# Patient Record
Sex: FEMALE | Race: OTHER | HISPANIC OR LATINO | Employment: FULL TIME | URBAN - METROPOLITAN AREA
[De-identification: names, ages, dates, MRNs, and addresses within clinical notes are randomized per-mention and may not be internally consistent; named-entity substitution may affect disease eponyms.]

---

## 2019-03-02 ENCOUNTER — APPOINTMENT (EMERGENCY)
Dept: RADIOLOGY | Facility: HOSPITAL | Age: 44
End: 2019-03-02

## 2019-03-02 ENCOUNTER — HOSPITAL ENCOUNTER (EMERGENCY)
Facility: HOSPITAL | Age: 44
Discharge: HOME | End: 2019-03-02
Attending: EMERGENCY MEDICINE | Admitting: EMERGENCY MEDICINE

## 2019-03-02 VITALS
OXYGEN SATURATION: 99 % | TEMPERATURE: 98.2 F | DIASTOLIC BLOOD PRESSURE: 60 MMHG | HEART RATE: 98 BPM | RESPIRATION RATE: 16 BRPM | SYSTOLIC BLOOD PRESSURE: 136 MMHG

## 2019-03-02 DIAGNOSIS — B02.9 HERPES ZOSTER: Primary | ICD-10-CM

## 2019-03-02 DIAGNOSIS — D72.829 LEUKOCYTOSIS: ICD-10-CM

## 2019-03-02 DIAGNOSIS — N64.4 BREAST PAIN, LEFT: ICD-10-CM

## 2019-03-02 LAB
ALBUMIN SERPL BCP-MCNC: 3.7 G/DL (ref 3.5–5)
ALP SERPL-CCNC: 64 U/L (ref 46–116)
ALT SERPL W P-5'-P-CCNC: 26 U/L (ref 12–78)
ANION GAP SERPL CALCULATED.3IONS-SCNC: 8 MMOL/L (ref 4–13)
APTT PPP: 26 SECONDS (ref 26–38)
AST SERPL W P-5'-P-CCNC: 17 U/L (ref 5–45)
BASOPHILS # BLD AUTO: 0.06 THOUSANDS/ΜL (ref 0–0.1)
BASOPHILS NFR BLD AUTO: 1 % (ref 0–1)
BILIRUB SERPL-MCNC: 0.27 MG/DL (ref 0.2–1)
BUN SERPL-MCNC: 15 MG/DL (ref 5–25)
CALCIUM SERPL-MCNC: 9 MG/DL (ref 8.3–10.1)
CHLORIDE SERPL-SCNC: 104 MMOL/L (ref 100–108)
CK SERPL-CCNC: 97 U/L (ref 26–192)
CO2 SERPL-SCNC: 27 MMOL/L (ref 21–32)
CREAT SERPL-MCNC: 1.03 MG/DL (ref 0.6–1.3)
EOSINOPHIL # BLD AUTO: 0.22 THOUSAND/ΜL (ref 0–0.61)
EOSINOPHIL NFR BLD AUTO: 2 % (ref 0–6)
ERYTHROCYTE [DISTWIDTH] IN BLOOD BY AUTOMATED COUNT: 13.2 % (ref 11.6–15.1)
EXT PREG TEST URINE: NEGATIVE
GFR SERPL CREATININE-BSD FRML MDRD: 67 ML/MIN/1.73SQ M
GLUCOSE SERPL-MCNC: 67 MG/DL (ref 65–140)
HCT VFR BLD AUTO: 42.4 % (ref 34.8–46.1)
HGB BLD-MCNC: 13.7 G/DL (ref 11.5–15.4)
IMM GRANULOCYTES # BLD AUTO: 0.03 THOUSAND/UL (ref 0–0.2)
IMM GRANULOCYTES NFR BLD AUTO: 0 % (ref 0–2)
INR PPP: 0.95 (ref 0.86–1.17)
LIPASE SERPL-CCNC: 147 U/L (ref 73–393)
LYMPHOCYTES # BLD AUTO: 4.26 THOUSANDS/ΜL (ref 0.6–4.47)
LYMPHOCYTES NFR BLD AUTO: 36 % (ref 14–44)
MAGNESIUM SERPL-MCNC: 1.9 MG/DL (ref 1.6–2.6)
MCH RBC QN AUTO: 29.7 PG (ref 26.8–34.3)
MCHC RBC AUTO-ENTMCNC: 32.3 G/DL (ref 31.4–37.4)
MCV RBC AUTO: 92 FL (ref 82–98)
MONOCYTES # BLD AUTO: 1 THOUSAND/ΜL (ref 0.17–1.22)
MONOCYTES NFR BLD AUTO: 8 % (ref 4–12)
NEUTROPHILS # BLD AUTO: 6.43 THOUSANDS/ΜL (ref 1.85–7.62)
NEUTS SEG NFR BLD AUTO: 53 % (ref 43–75)
NRBC BLD AUTO-RTO: 0 /100 WBCS
PLATELET # BLD AUTO: 225 THOUSANDS/UL (ref 149–390)
PMV BLD AUTO: 10.5 FL (ref 8.9–12.7)
POTASSIUM SERPL-SCNC: 3.8 MMOL/L (ref 3.5–5.3)
PROT SERPL-MCNC: 7.6 G/DL (ref 6.4–8.2)
PROTHROMBIN TIME: 12.8 SECONDS (ref 11.8–14.2)
RBC # BLD AUTO: 4.62 MILLION/UL (ref 3.81–5.12)
SODIUM SERPL-SCNC: 139 MMOL/L (ref 136–145)
TROPONIN I SERPL-MCNC: <0.02 NG/ML
WBC # BLD AUTO: 12 THOUSAND/UL (ref 4.31–10.16)

## 2019-03-02 PROCEDURE — 82550 ASSAY OF CK (CPK): CPT | Performed by: NURSE PRACTITIONER

## 2019-03-02 PROCEDURE — 83735 ASSAY OF MAGNESIUM: CPT | Performed by: NURSE PRACTITIONER

## 2019-03-02 PROCEDURE — 80053 COMPREHEN METABOLIC PANEL: CPT | Performed by: NURSE PRACTITIONER

## 2019-03-02 PROCEDURE — 36415 COLL VENOUS BLD VENIPUNCTURE: CPT | Performed by: NURSE PRACTITIONER

## 2019-03-02 PROCEDURE — 83690 ASSAY OF LIPASE: CPT | Performed by: NURSE PRACTITIONER

## 2019-03-02 PROCEDURE — 93005 ELECTROCARDIOGRAM TRACING: CPT

## 2019-03-02 PROCEDURE — 71046 X-RAY EXAM CHEST 2 VIEWS: CPT

## 2019-03-02 PROCEDURE — 85610 PROTHROMBIN TIME: CPT | Performed by: NURSE PRACTITIONER

## 2019-03-02 PROCEDURE — 99284 EMERGENCY DEPT VISIT MOD MDM: CPT

## 2019-03-02 PROCEDURE — 81025 URINE PREGNANCY TEST: CPT | Performed by: NURSE PRACTITIONER

## 2019-03-02 PROCEDURE — 85730 THROMBOPLASTIN TIME PARTIAL: CPT | Performed by: NURSE PRACTITIONER

## 2019-03-02 PROCEDURE — 85025 COMPLETE CBC W/AUTO DIFF WBC: CPT | Performed by: NURSE PRACTITIONER

## 2019-03-02 PROCEDURE — 84484 ASSAY OF TROPONIN QUANT: CPT | Performed by: NURSE PRACTITIONER

## 2019-03-02 RX ORDER — ACYCLOVIR 800 MG/1
800 TABLET ORAL
Qty: 35 TABLET | Refills: 0 | Status: SHIPPED | OUTPATIENT
Start: 2019-03-02 | End: 2019-03-09

## 2019-03-02 RX ORDER — ACYCLOVIR 800 MG/1
800 TABLET ORAL
Status: DISCONTINUED | OUTPATIENT
Start: 2019-03-02 | End: 2019-03-03 | Stop reason: HOSPADM

## 2019-03-02 RX ORDER — OXYCODONE HYDROCHLORIDE AND ACETAMINOPHEN 5; 325 MG/1; MG/1
1 TABLET ORAL ONCE
Status: COMPLETED | OUTPATIENT
Start: 2019-03-02 | End: 2019-03-02

## 2019-03-02 RX ORDER — OXYCODONE HYDROCHLORIDE AND ACETAMINOPHEN 5; 325 MG/1; MG/1
1 TABLET ORAL EVERY 6 HOURS PRN
Qty: 10 TABLET | Refills: 0 | Status: SHIPPED | OUTPATIENT
Start: 2019-03-02 | End: 2019-03-05

## 2019-03-02 RX ORDER — DOCUSATE SODIUM 100 MG/1
100 CAPSULE, LIQUID FILLED ORAL EVERY 12 HOURS
Qty: 60 CAPSULE | Refills: 0 | Status: SHIPPED | OUTPATIENT
Start: 2019-03-02

## 2019-03-02 RX ORDER — DOCUSATE SODIUM 100 MG/1
100 CAPSULE, LIQUID FILLED ORAL ONCE
Status: COMPLETED | OUTPATIENT
Start: 2019-03-02 | End: 2019-03-02

## 2019-03-02 RX ADMIN — ACYCLOVIR 800 MG: 800 TABLET ORAL at 23:19

## 2019-03-02 RX ADMIN — DOCUSATE SODIUM 100 MG: 100 CAPSULE, LIQUID FILLED ORAL at 23:19

## 2019-03-02 RX ADMIN — OXYCODONE HYDROCHLORIDE AND ACETAMINOPHEN 1 TABLET: 5; 325 TABLET ORAL at 23:19

## 2019-03-03 NOTE — ED PROVIDER NOTES
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  She does state she had chicken pox as a child  She also mentions that her last mammogram was 3 years ago and that her mother  of breast cancer  She denies any actual chest pain or SOB or pain with inspiration  She states she did recently fly 2 months ago  Denies hx of DVT, PE, smoking, OCP  Pt denies any lesions on skin or nipple drainage, palpable masses or dimpling of skin  Denies breast implants       History provided by:  Patient and medical records   used: No        None       Past Medical History:   Diagnosis Date    History of nephrectomy        Past Surgical History:   Procedure Laterality Date    APPENDECTOMY      TUBAL LIGATION         History reviewed  No pertinent family history  I have reviewed and agree with the history as documented  Social History     Tobacco Use    Smoking status: Never Smoker   Substance Use Topics    Alcohol use: Never     Frequency: Never    Drug use: Never        Review of Systems   Constitutional: Negative  HENT: Negative  Eyes: Negative  Respiratory: Negative  Cardiovascular: Negative  Gastrointestinal: Negative  Endocrine: Negative  Genitourinary: Negative  Musculoskeletal: Positive for back pain  Skin: Negative for rash  Allergic/Immunologic: Negative  Neurological: Negative  Hematological: Negative  Psychiatric/Behavioral: Negative  Physical Exam  Physical Exam   Constitutional: She is oriented to person, place, and time   She appears well-developed and well-nourished  She appears distressed  Appears uncomfortable  HENT:   Head: Normocephalic and atraumatic  Eyes: Pupils are equal, round, and reactive to light  EOM are normal    Neck: Normal range of motion  Neck supple  Cardiovascular: Normal rate, regular rhythm and normal heart sounds  Pulmonary/Chest: Effort normal and breath sounds normal  No stridor  No respiratory distress  She has no wheezes  She has no rales  She exhibits no tenderness  Abdominal: Soft  Bowel sounds are normal  She exhibits no distension  There is no tenderness  Musculoskeletal: Normal range of motion  Neurological: She is alert and oriented to person, place, and time  Skin: Skin is warm and dry  Capillary refill takes less than 2 seconds  No rash noted  She is not diaphoretic  No erythema  No palpable lymphadenopathy noted    Psychiatric: She has a normal mood and affect  Her behavior is normal  Judgment and thought content normal    Nursing note and vitals reviewed        Vital Signs  ED Triage Vitals   Temperature Pulse Respirations Blood Pressure SpO2   03/02/19 2050 03/02/19 2050 03/02/19 2050 03/02/19 2051 03/02/19 2050   98 2 °F (36 8 °C) 98 16 136/60 99 %      Temp Source Heart Rate Source Patient Position - Orthostatic VS BP Location FiO2 (%)   03/02/19 2050 03/02/19 2050 03/02/19 2050 03/02/19 2050 --   Temporal Monitor Sitting Right arm       Pain Score       --                  Vitals:    03/02/19 2050 03/02/19 2051   BP:  136/60   Pulse: 98    Patient Position - Orthostatic VS: Sitting        Visual Acuity      ED Medications  Medications   acyclovir (ZOVIRAX) tablet 800 mg (800 mg Oral Given 3/2/19 2319)   oxyCODONE-acetaminophen (PERCOCET) 5-325 mg per tablet 1 tablet (1 tablet Oral Given 3/2/19 2319)   docusate sodium (COLACE) capsule 100 mg (100 mg Oral Given 3/2/19 2319)       Diagnostic Studies  Results Reviewed     Procedure Component Value Units Date/Time    Troponin I [[id number]]  (Normal) Collected:  03/02/19 2151    Lab Status:  Final result Specimen:  Blood from Arm, Right Updated:  03/02/19 2221     Troponin I <0 02 ng/mL     Comprehensive metabolic panel [[id number]] Collected:  03/02/19 2151    Lab Status:  Final result Specimen:  Blood from Arm, Right Updated:  03/02/19 2220     Sodium 139 mmol/L      Potassium 3 8 mmol/L      Chloride 104 mmol/L      CO2 27 mmol/L      ANION GAP 8 mmol/L      BUN 15 mg/dL      Creatinine 1 03 mg/dL      Glucose 67 mg/dL      Calcium 9 0 mg/dL      AST 17 U/L      ALT 26 U/L      Alkaline Phosphatase 64 U/L      Total Protein 7 6 g/dL      Albumin 3 7 g/dL      Total Bilirubin 0 27 mg/dL      eGFR 67 ml/min/1 73sq m     Narrative:       National Kidney Disease Education Program recommendations are as follows:  GFR calculation is accurate only with a steady state creatinine  Chronic Kidney disease less than 60 ml/min/1 73 sq  meters  Kidney failure less than 15 ml/min/1 73 sq  meters      Lipase [[id number]]  (Normal) Collected:  03/02/19 2151    Lab Status:  Final result Specimen:  Blood from Arm, Right Updated:  03/02/19 2220     Lipase 147 u/L     Magnesium [[id number]]  (Normal) Collected:  03/02/19 2151    Lab Status:  Final result Specimen:  Blood from Arm, Right Updated:  03/02/19 2219     Magnesium 1 9 mg/dL     CK Total with Reflex CKMB [[id number]]  (Normal) Collected:  03/02/19 2151    Lab Status:  Final result Specimen:  Blood from Arm, Right Updated:  03/02/19 2219     Total CK 97 U/L     Protime-INR [[id number]]  (Normal) Collected:  03/02/19 2151    Lab Status:  Final result Specimen:  Blood from Arm, Right Updated:  03/02/19 2214     Protime 12 8 seconds      INR 0 95    APTT [[id number]]  (Normal) Collected:  03/02/19 2151    Lab Status:  Final result Specimen:  Blood from Arm, Right Updated:  03/02/19 2214     PTT 26 seconds     POCT pregnancy, urine [[id number]]  (Normal) Resulted:  03/02/19 2202    Lab Status:  Final result Updated:  03/02/19 2202 EXT PREG TEST UR (Ref: Negative) negative    CBC and differential [[id number]]  (Abnormal) Collected:  03/02/19 2151    Lab Status:  Final result Specimen:  Blood from Arm, Right Updated:  03/02/19 2200     WBC 12 00 Thousand/uL      RBC 4 62 Million/uL      Hemoglobin 13 7 g/dL      Hematocrit 42 4 %      MCV 92 fL      MCH 29 7 pg      MCHC 32 3 g/dL      RDW 13 2 %      MPV 10 5 fL      Platelets 490 Thousands/uL      nRBC 0 /100 WBCs      Neutrophils Relative 53 %      Immat GRANS % 0 %      Lymphocytes Relative 36 %      Monocytes Relative 8 %      Eosinophils Relative 2 %      Basophils Relative 1 %      Neutrophils Absolute 6 43 Thousands/µL      Immature Grans Absolute 0 03 Thousand/uL      Lymphocytes Absolute 4 26 Thousands/µL      Monocytes Absolute 1 00 Thousand/µL      Eosinophils Absolute 0 22 Thousand/µL      Basophils Absolute 0 06 Thousands/µL                  XR chest 2 views   ED Interpretation by POLA Catalan (03/02 2231)   Preliminary reading    No acute process seen   Waiting on rad read                  Procedures  ECG 12 Lead Documentation  Date/Time: 3/2/2019 10:59 PM  Performed by: POLA Catalan  Authorized by: POLA Catalan     Indications / Diagnosis:  Back pain, breast pain   ECG reviewed by me, the ED Provider: yes (Dr Paige Palacio )    Patient location:  ED  Previous ECG:     Previous ECG:  Unavailable    Comparison to cardiac monitor: No    Interpretation:     Interpretation: abnormal    Rate:     ECG rate:  77    ECG rate assessment: normal    Rhythm:     Rhythm: sinus rhythm    QRS:     QRS axis:  Normal  T waves:     T waves: normal             Phone Contacts  ED Phone Contact    ED Course  ED Course as of Mar 03 0053   Sat Mar 02, 2019   2257 Labs reviewed and are unremarkable  Trop < 0 02  EKG NSR  Symptoms consistent with shingles/herpes zoster  Will treat with acyclovir, colace and percocet   Pt agrees to [address]         2258 Pt verbalizes understanding of d/c instructions and follow up  WBC 12 0 most likely due to inflammation and zoster  2305 I have explained in great detail that pt needs blood work done to check on kidney status with acyclovir as it can cause elevated creatine  Pt instructed to drink a lot of water  She denies PCP - I have given her the [facility] number and told her to call on Monday  Strict return precautions given      GFR 67  Cr 1 03                    PERC Rule for PE      Most Recent Value   PERC Rule for PE   Age >=50  0 Filed at: 03/02/2019 2202   HR >=100  0 Filed at: 03/02/2019 2202   O2 Sat on room air < 95%  0 Filed at: 03/02/2019 2202   History of PE or DVT  0 Filed at: 03/02/2019 2202   Recent trauma or surgery  0 Filed at: 03/02/2019 2202   Hemoptysis  0 Filed at: 03/02/2019 2202   Exogenous estrogen  0 Filed at: 03/02/2019 2202   Unilateral leg swelling  0 Filed at: 03/02/2019 2202   PERC Rule for PE Results  0 Filed at: 03/02/2019 2202                Marciano Stout Criteria for PE      Most Recent Value   Wells' Criteria for PE   Clinical signs and symptoms of DVT  0 Filed at: 03/02/2019 2202   PE is primary diagnosis or equally likely  0 Filed at: 03/02/2019 2202   HR >100  0 Filed at: 03/02/2019 2202   Immobilization at least 3 days or Surgery in the previous 4 weeks  0 Filed at: 03/02/2019 2202   Previous, objectively diagnosed PE or DVT  0 Filed at: 03/02/2019 2202   Hemoptysis  0 Filed at: 03/02/2019 2202   Malignancy with treatment within 6 months or palliative  0 Filed at: 03/02/2019 2202   Wells' Criteria Total  0 Filed at: 03/02/2019 2202            [facility]  Number of Diagnoses or Management Options  Diagnosis management comments: Differential diagnosis:  Shingles - early  Cardiac - STEMI, NSTEMMI  Doubt PE  Wells and PERC score 0 - 0 risk factors        Disposition  Final diagnoses:   Herpes zoster   Breast pain, left   Leukocytosis     Time reflects when diagnosis was documented in both MDM as applicable and the Disposition within this note     Time User Action Codes Description Comment    3/2/2019 10:52 PM Connor Lee Add [B02 9] Herpes zoster     3/2/2019 10:52 PM Connor Lee Add [N64 4] Breast pain, left     3/2/2019 11:18 PM Connor Lee Add [C02 624] Leukocytosis       ED Disposition     ED Disposition Condition Date/Time Comment    Discharge Stable Sat Mar 2, 2019 10:52 PM Katey Lissette discharge to home/self care  Follow-up Information     Follow up With Specialties Details Why Contact Info Additional [facility] [place] [facility] Schedule an appointment as soon as possible for a visit in 2 days  [address] [facility] [address] [id number] 8[address] [facility] Emergency Department Emergency Medicine  If symptoms worsen [facility] [address]  [place] [address] ED, [address] [place], [place], [address]          Discharge Medication List as of 3/2/2019 10:56 PM      START taking these medications    Details   acyclovir (ZOVIRAX) 800 mg tablet Take 1 tablet (800 mg total) by mouth 5 (five) times a day for 7 days, Starting Sat 3/2/2019, Until Sat 3/9/2019, Print      docusate sodium (COLACE) 100 mg capsule Take 1 capsule (100 mg total) by mouth every 12 (twelve) hours, Starting Sat 3/2/2019, Print      oxyCODONE-acetaminophen (PERCOCET) 5-325 mg per tablet Take 1 tablet by mouth every 6 (six) hours as needed for severe pain for up to 3 daysMax Daily Amount: 4 tablets, Starting Sat 3/2/2019, Until Tue 3/5/2019, Print           No discharge procedures on file      ED Provider  Electronically Signed by           Santa Kingsley  03/03/19 1145

## 2019-03-03 NOTE — DISCHARGE INSTRUCTIONS
Call your PCP on Monday for an appointment and you are to get a mammogram done since you have a high risk for breast cancer  Your symptoms are compatible with shingles - herpes zoster  You do not have any open lesions but may have some in the near future  Take the percocet for pain - do not drink alcohol or drive machinery while taking  Take the colace to prevent constipation  You are to take the acyclovir as prescribed  You are to increase water intake  You should have blood work done to check kidney status since you taking acyclovir

## 2019-03-04 LAB
ATRIAL RATE: 77 BPM
P AXIS: 69 DEGREES
PR INTERVAL: 156 MS
QRS AXIS: 64 DEGREES
QRSD INTERVAL: 74 MS
QT INTERVAL: 354 MS
QTC INTERVAL: 400 MS
T WAVE AXIS: 54 DEGREES
VENTRICULAR RATE: 77 BPM

## 2019-03-04 PROCEDURE — 93010 ELECTROCARDIOGRAM REPORT: CPT | Performed by: INTERNAL MEDICINE

## 2019-03-05 ENCOUNTER — APPOINTMENT (OUTPATIENT)
Dept: URGENT CARE | Age: 44
End: 2019-03-05
Payer: OTHER MISCELLANEOUS

## 2019-03-05 PROCEDURE — 99283 EMERGENCY DEPT VISIT LOW MDM: CPT | Performed by: PREVENTIVE MEDICINE

## 2019-03-05 PROCEDURE — G0382 LEV 3 HOSP TYPE B ED VISIT: HCPCS | Performed by: PREVENTIVE MEDICINE

## 2019-03-15 ENCOUNTER — APPOINTMENT (OUTPATIENT)
Dept: URGENT CARE | Facility: MEDICAL CENTER | Age: 44
End: 2019-03-15
Payer: OTHER MISCELLANEOUS

## 2019-03-15 PROCEDURE — 99212 OFFICE O/P EST SF 10 MIN: CPT | Performed by: PHYSICIAN ASSISTANT

## 2019-03-22 ENCOUNTER — APPOINTMENT (OUTPATIENT)
Dept: URGENT CARE | Facility: MEDICAL CENTER | Age: 44
End: 2019-03-22
Payer: OTHER MISCELLANEOUS

## 2019-03-22 PROCEDURE — 99212 OFFICE O/P EST SF 10 MIN: CPT | Performed by: FAMILY MEDICINE

## 2021-05-24 ENCOUNTER — ANNUAL EXAM (OUTPATIENT)
Dept: OBGYN CLINIC | Facility: CLINIC | Age: 46
End: 2021-05-24
Payer: COMMERCIAL

## 2021-05-24 VITALS
WEIGHT: 209.2 LBS | DIASTOLIC BLOOD PRESSURE: 68 MMHG | BODY MASS INDEX: 33.62 KG/M2 | HEIGHT: 66 IN | SYSTOLIC BLOOD PRESSURE: 122 MMHG

## 2021-05-24 DIAGNOSIS — N94.10 DYSPAREUNIA, FEMALE: ICD-10-CM

## 2021-05-24 DIAGNOSIS — Z12.4 ENCOUNTER FOR PAPANICOLAOU SMEAR FOR CERVICAL CANCER SCREENING: ICD-10-CM

## 2021-05-24 DIAGNOSIS — N94.6 DYSMENORRHEA: ICD-10-CM

## 2021-05-24 DIAGNOSIS — R10.2 PELVIC PAIN IN FEMALE: ICD-10-CM

## 2021-05-24 DIAGNOSIS — Z12.31 ENCOUNTER FOR SCREENING MAMMOGRAM FOR BREAST CANCER: ICD-10-CM

## 2021-05-24 DIAGNOSIS — Z01.411 ENCOUNTER FOR GYNECOLOGICAL EXAMINATION WITH ABNORMAL FINDING: Primary | ICD-10-CM

## 2021-05-24 DIAGNOSIS — Z11.51 SCREENING FOR HPV (HUMAN PAPILLOMAVIRUS): ICD-10-CM

## 2021-05-24 PROCEDURE — G0124 SCREEN C/V THIN LAYER BY MD: HCPCS | Performed by: PATHOLOGY

## 2021-05-24 PROCEDURE — S0610 ANNUAL GYNECOLOGICAL EXAMINA: HCPCS | Performed by: NURSE PRACTITIONER

## 2021-05-24 PROCEDURE — G0145 SCR C/V CYTO,THINLAYER,RESCR: HCPCS | Performed by: PATHOLOGY

## 2021-05-24 PROCEDURE — 87624 HPV HI-RISK TYP POOLED RSLT: CPT | Performed by: NURSE PRACTITIONER

## 2021-05-24 NOTE — PROGRESS NOTES
Assessment / Plan    1  Encounter for gynecological examination with abnormal finding  Well woman exam   Pap with HPV obtained  If negative, will repeat once more next year, then routine screening  2  Encounter for Papanicolaou smear for cervical cancer screening    - Liquid-based pap, screening    3  Screening for HPV (human papillomavirus)    - Liquid-based pap, screening    4  Encounter for screening mammogram for breast cancer  Order provided for update    - Mammo screening bilateral w 3d & cad; Future    5  Dysmenorrhea    - US pelvis complete w transvaginal; Future    6  Dyspareunia, female    - US pelvis complete w transvaginal; Future    7  Pelvic pain in female    - US pelvis complete w transvaginal; Future    Pelvic US to evaluate severe dysmenorrhea, menorrhagia, dyspareunia and pelvic pain  RV 3w to review findings/ discuss mgt  Subjective      Vera Reynolds is a 40 y o  female who presents for her annual gynecologic exam   NEW PATIENT    , presents for GYN care  Prior care at [address] [place] [address]  Last pap:  ASCUS w/ pos other HPV  Did not do follow up colposcopy  Last mammogram:   Sexually active: rarely-- has dryness and pain with sex  STD hx negative  Does a lot of walking, eliminates simple sugars from her diet  States she has gained a lot of weight since donating a kidney 5 years ago  Reports very heavy and very painful periods  Interval every 1-2 months  Also has intermenstrual pain as well as pain with sex      Periods are irregular,   Current contraception: tubal ligation  History of abnormal Pap smear: yes -   Family history of breast,uterine, ovarian or colon cancer: yes - mother breast ca, sister ovarian ca (age 50)      Menstrual History:  OB History        3    Para   3    Term   3       0    AB   0    Living   0       SAB   0    TAB   0    Ectopic   0    Multiple   0    Live Births   3           Obstetric Comments   Menarche 16  Vaginal births x 3              Patient's last menstrual period was 04/21/2021  Period Cycle (Days): (every one to two months)  Period Duration (Days): 7  Period Pattern: Regular  Menstrual Flow: Heavy(days 1-5)  Menstrual Control: Tampon, Maxi pad  Menstrual Control Change Freq (Hours): (every 1 1/2 hours)  Dysmenorrhea: (!) Severe  Dysmenorrhea Symptoms: Cramping    The following portions of the patient's history were reviewed and updated as appropriate: allergies, current medications, past family history, past medical history, past social history, past surgical history and problem list     Review of Systems      Review of Systems  Breasts:  Negative for skin changes, dimpling, asymmetry, nipple discharge, redness, tenderness or palpable masses    Objective      /68 (BP Location: Left arm, Patient Position: Sitting, Cuff Size: Standard)   Ht 5' 6" (1 676 m)   Wt 94 9 kg (209 lb 3 2 oz)   LMP 04/21/2021   BMI 33 77 kg/m²   Physical Exam  Constitutional:       General: She is not in acute distress  Appearance: Normal appearance  She is well-developed  She is obese  She is not ill-appearing or diaphoretic  HENT:      Head: Normocephalic and atraumatic  Eyes:      Pupils: Pupils are equal, round, and reactive to light  Neck:      Musculoskeletal: Neck supple  Thyroid: No thyromegaly  Pulmonary:      Effort: Pulmonary effort is normal    Chest:      Breasts: Breasts are symmetrical          Right: No inverted nipple, mass, nipple discharge, skin change or tenderness  Left: No inverted nipple, mass, nipple discharge, skin change or tenderness  Abdominal:      General: There is no distension  Palpations: Abdomen is soft  There is no mass  Tenderness: There is no abdominal tenderness  There is no guarding or rebound  Genitourinary:     General: Normal vulva  Exam position: Lithotomy position  Labia:         Right: No rash, tenderness, lesion or injury           Left: No rash, tenderness, lesion or injury  Vagina: No signs of injury and foreign body  No vaginal discharge, erythema, tenderness or bleeding  Cervix: No cervical motion tenderness, discharge or friability  Uterus: Tender  Not enlarged  Adnexa:         Right: No mass, tenderness or fullness  Left: No mass, tenderness or fullness  Comments: Speculum and digital exam uncomfortable for patient  Lymphadenopathy:      Cervical: No cervical adenopathy  Upper Body:      Right upper body: No supraclavicular adenopathy  Left upper body: No supraclavicular adenopathy  Skin:     General: Skin is warm and dry  Neurological:      General: No focal deficit present  Mental Status: She is alert and oriented to person, place, and time  She is not disoriented  Psychiatric:         Mood and Affect: Mood normal          Behavior: Behavior normal          Thought Content:  Thought content normal          Judgment: Judgment normal

## 2021-05-24 NOTE — PROGRESS NOTES
Assessment / Plan          Ebony Castañeda is a 40 y o  female who presents for her annual gynecologic exam   NEW PATIENT    Prior care at [facility] obgyn, 2014  hx ASCUS/+HPV 3/2013    Periods are {gyn period regularity:715}, lasting {numbers; 0-10:53502} days  Dysmenorrhea:{gyn dysmenorrhea:716}  Cyclic symptoms include {sys gyn cyclic OC:62401}  No intermenstrual bleeding, spotting, or discharge  Current contraception: {contraceptive method:5051}  History of abnormal Pap smear: {yes***/no:73494}  Family history of breast,uterine, ovarian or colon cancer: {yes***/no:11186}      Menstrual History:  OB History        0    Para   0    Term   0       0    AB   0    Living   0       SAB   0    TAB   0    Ectopic   0    Multiple   0    Live Births   0                Menarche age: ***  Patient's last menstrual period was 2021         {Common ambulatory SmartLinks:25940}    Review of Systems      Review of Systems  Breasts:  Negative for skin changes, dimpling, asymmetry, nipple discharge, redness, tenderness or palpable masses    Objective      /68 (BP Location: Left arm, Patient Position: Sitting, Cuff Size: Standard)   Ht 5' 6" (1 676 m)   Wt 94 9 kg (209 lb 3 2 oz)   LMP 2021   BMI 33 77 kg/m²   Physical Exam

## 2021-05-24 NOTE — PATIENT INSTRUCTIONS
Weight Management   WHAT YOU NEED TO KNOW:   Being overweight increases your risk of health conditions such as heart disease, high blood pressure, type 2 diabetes, and certain types of cancer  It can also increase your risk for osteoarthritis, sleep apnea, and other respiratory problems  Aim for a slow, steady weight loss  Even a small amount of weight loss can lower your risk of health problems  DISCHARGE INSTRUCTIONS:   How to lose weight safely:  A safe and healthy way to lose weight is to eat fewer calories and get regular exercise  · You can lose up about 1 pound a week by decreasing the number of calories you eat by 500 calories each day  You can decrease calories by eating smaller portion sizes or by cutting out high-calorie foods  Read labels to find out how many calories are in the foods you eat  · You can also burn calories with exercise such as walking, swimming, or biking  You will be more likely to keep weight off if you make these changes part of your lifestyle  Exercise at least 30 minutes per day on most days of the week  You can also fit in more physical activity by taking the stairs instead of the elevator or parking farther away from stores  Ask your healthcare provider about the best exercise plan for you  Healthy meal plan for weight management:  A healthy meal plan includes a variety of foods, contains fewer calories, and helps you stay healthy  A healthy meal plan includes the following:     · Eat whole-grain foods more often  A healthy meal plan should contain fiber  Fiber is the part of grains, fruits, and vegetables that is not broken down by your body  Whole-grain foods are healthy and provide extra fiber in your diet  Some examples of whole-grain foods are whole-wheat breads and pastas, oatmeal, brown rice, and bulgur  · Eat a variety of vegetables every day  Include dark, leafy greens such as spinach, kale, emmanuel greens, and mustard greens   Eat yellow and orange vegetables such as carrots, sweet potatoes, and winter squash  · Eat a variety of fruits every day  Choose fresh or canned fruit (canned in its own juice or light syrup) instead of juice  Fruit juice has very little or no fiber  · Eat low-fat dairy foods  Drink fat-free (skim) milk or 1% milk  Eat fat-free yogurt and low-fat cottage cheese  Try low-fat cheeses such as mozzarella and other reduced-fat cheeses  · Choose meat and other protein foods that are low in fat  Choose beans or other legumes such as split peas or lentils  Choose fish, skinless poultry (chicken or turkey), or lean cuts of red meat (beef or pork)  Before you cook meat or poultry, cut off any visible fat  · Use less fat and oil  Try baking foods instead of frying them  Add less fat, such as margarine, sour cream, regular salad dressing, and mayonnaise to foods  Eat fewer high-fat foods  Some examples of high-fat foods include french fries, doughnuts, ice cream, and cakes  · Eat fewer sweets  Limit foods and drinks that are high in sugar  This includes candy, cookies, regular soda, and sweetened drinks  Ways to decrease calories:   · Eat smaller portions  ? Use a small plate with smaller servings  ? Do not eat second helpings  ? When you eat at a restaurant, ask for a box and place half of your meal in the box before you eat  ? Share an entrée with someone else  · Replace high-calorie snacks with healthy, low-calorie snacks  ? Choose fresh fruit, vegetables, fat-free rice cakes, or air-popped popcorn instead of potato chips, nuts, or chocolate  ? Choose water or calorie-free drinks instead of soda or sweetened drinks  · Do not shop for groceries when you are hungry  You may be more likely to make unhealthy food choices  Take a grocery list of healthy foods and shop after you have eaten  · Eat regular meals  Do not skip meals  Skipping meals can lead to overeating later in the day   This can make it harder for you to lose weight  Eat a healthy snack in place of a meal if you do not have time to eat a regular meal  Talk with a dietitian to help you create a meal plan and schedule that is right for you  Other things to consider as you try to lose weight:   · Be aware of situations that may give you the urge to overeat, such as eating while watching television  Find ways to avoid these situations  For example, read a book, go for a walk, or do crafts  · Meet with a weight loss support group or friends who are also trying to lose weight  This may help you stay motivated to continue working on your weight loss goals  © Copyright [address] Information is for End User's use only and may not be sold, redistributed or otherwise used for commercial purposes  All illustrations and images included in CareNotes® are the copyrighted property of [employer]  or [facility] Lucius Pate   The above information is an  only  It is not intended as medical advice for individual conditions or treatments  Talk to your doctor, nurse or pharmacist before following any medical regimen to see if it is safe and effective for you

## 2021-05-25 LAB
HPV HR 12 DNA CVX QL NAA+PROBE: NEGATIVE
HPV16 DNA CVX QL NAA+PROBE: NEGATIVE
HPV18 DNA CVX QL NAA+PROBE: NEGATIVE

## 2021-05-28 LAB
LAB AP GYN PRIMARY INTERPRETATION: ABNORMAL
Lab: ABNORMAL
PATH INTERP SPEC-IMP: ABNORMAL

## 2021-06-04 ENCOUNTER — HOSPITAL ENCOUNTER (OUTPATIENT)
Dept: ULTRASOUND IMAGING | Facility: HOSPITAL | Age: 46
Discharge: HOME | End: 2021-06-04
Attending: NURSE PRACTITIONER
Payer: COMMERCIAL

## 2021-06-04 DIAGNOSIS — N94.10 DYSPAREUNIA, FEMALE: ICD-10-CM

## 2021-06-04 DIAGNOSIS — R10.2 PELVIC PAIN IN FEMALE: ICD-10-CM

## 2021-06-04 DIAGNOSIS — N94.6 DYSMENORRHEA: ICD-10-CM

## 2021-06-04 PROCEDURE — 76856 US EXAM PELVIC COMPLETE: CPT

## 2021-06-04 PROCEDURE — 76830 TRANSVAGINAL US NON-OB: CPT

## 2021-06-24 ENCOUNTER — HOSPITAL ENCOUNTER (OUTPATIENT)
Dept: MAMMOGRAPHY | Facility: MEDICAL CENTER | Age: 46
Discharge: HOME | End: 2021-06-24
Payer: COMMERCIAL

## 2021-06-24 VITALS — BODY MASS INDEX: 33.59 KG/M2 | WEIGHT: 209 LBS | HEIGHT: 66 IN

## 2021-06-24 DIAGNOSIS — Z12.31 ENCOUNTER FOR SCREENING MAMMOGRAM FOR BREAST CANCER: ICD-10-CM

## 2021-06-24 PROCEDURE — 77067 SCR MAMMO BI INCL CAD: CPT

## 2021-06-24 PROCEDURE — 77063 BREAST TOMOSYNTHESIS BI: CPT

## 2021-06-30 NOTE — RESULT ENCOUNTER NOTE
Screening mammogram is normal   Recommend routine screening in one year    Informed via [facility]

## 2024-12-16 ENCOUNTER — APPOINTMENT (EMERGENCY)
Dept: CT IMAGING | Facility: HOSPITAL | Age: 49
End: 2024-12-16

## 2024-12-16 ENCOUNTER — HOSPITAL ENCOUNTER (EMERGENCY)
Facility: HOSPITAL | Age: 49
Discharge: HOME | End: 2024-12-16
Attending: EMERGENCY MEDICINE

## 2024-12-16 VITALS
SYSTOLIC BLOOD PRESSURE: 133 MMHG | DIASTOLIC BLOOD PRESSURE: 60 MMHG | WEIGHT: 210.1 LBS | RESPIRATION RATE: 16 BRPM | TEMPERATURE: 99.3 F | OXYGEN SATURATION: 97 % | HEART RATE: 104 BPM | BODY MASS INDEX: 33.91 KG/M2

## 2024-12-16 DIAGNOSIS — E83.42 HYPOMAGNESEMIA: ICD-10-CM

## 2024-12-16 DIAGNOSIS — K52.9 GASTROENTERITIS: Primary | ICD-10-CM

## 2024-12-16 LAB
ALBUMIN SERPL BCG-MCNC: 4 G/DL (ref 3.5–5)
ALP SERPL-CCNC: 46 U/L (ref 34–104)
ALT SERPL W P-5'-P-CCNC: 17 U/L (ref 7–52)
ANION GAP SERPL CALCULATED.3IONS-SCNC: 8 MMOL/L (ref 4–13)
AST SERPL W P-5'-P-CCNC: 17 U/L (ref 13–39)
B-HCG SERPL-ACNC: <0.6 MIU/ML (ref 0–5)
BACTERIA UR QL AUTO: NORMAL /HPF
BASOPHILS # BLD AUTO: 0.02 THOUSANDS/ÂΜL (ref 0–0.1)
BASOPHILS NFR BLD AUTO: 0 % (ref 0–1)
BILIRUB SERPL-MCNC: 0.79 MG/DL (ref 0.2–1)
BILIRUB UR QL STRIP: NEGATIVE
BUN SERPL-MCNC: 13 MG/DL (ref 5–25)
CALCIUM SERPL-MCNC: 8.9 MG/DL (ref 8.4–10.2)
CHLORIDE SERPL-SCNC: 103 MMOL/L (ref 96–108)
CLARITY UR: CLEAR
CO2 SERPL-SCNC: 25 MMOL/L (ref 21–32)
COLOR UR: ABNORMAL
CREAT SERPL-MCNC: 1.04 MG/DL (ref 0.6–1.3)
EOSINOPHIL # BLD AUTO: 0.01 THOUSAND/ÂΜL (ref 0–0.61)
EOSINOPHIL NFR BLD AUTO: 0 % (ref 0–6)
ERYTHROCYTE [DISTWIDTH] IN BLOOD BY AUTOMATED COUNT: 16.1 % (ref 11.6–15.1)
GFR SERPL CREATININE-BSD FRML MDRD: 63 ML/MIN/1.73SQ M
GLUCOSE SERPL-MCNC: 107 MG/DL (ref 65–140)
GLUCOSE UR STRIP-MCNC: NEGATIVE MG/DL
HCT VFR BLD AUTO: 38.5 % (ref 34.8–46.1)
HGB BLD-MCNC: 12.4 G/DL (ref 11.5–15.4)
HGB UR QL STRIP.AUTO: NEGATIVE
IMM GRANULOCYTES # BLD AUTO: 0.05 THOUSAND/UL (ref 0–0.2)
IMM GRANULOCYTES NFR BLD AUTO: 0 % (ref 0–2)
KETONES UR STRIP-MCNC: NEGATIVE MG/DL
LEUKOCYTE ESTERASE UR QL STRIP: NEGATIVE
LIPASE SERPL-CCNC: 11 U/L (ref 11–82)
LYMPHOCYTES # BLD AUTO: 0.55 THOUSANDS/ÂΜL (ref 0.6–4.47)
LYMPHOCYTES NFR BLD AUTO: 5 % (ref 14–44)
MAGNESIUM SERPL-MCNC: 1.5 MG/DL (ref 1.9–2.7)
MCH RBC QN AUTO: 27.3 PG (ref 26.8–34.3)
MCHC RBC AUTO-ENTMCNC: 32.2 G/DL (ref 31.4–37.4)
MCV RBC AUTO: 85 FL (ref 82–98)
MONOCYTES # BLD AUTO: 0.57 THOUSAND/ÂΜL (ref 0.17–1.22)
MONOCYTES NFR BLD AUTO: 5 % (ref 4–12)
NEUTROPHILS # BLD AUTO: 10.23 THOUSANDS/ÂΜL (ref 1.85–7.62)
NEUTS SEG NFR BLD AUTO: 90 % (ref 43–75)
NITRITE UR QL STRIP: NEGATIVE
NON-SQ EPI CELLS URNS QL MICRO: NORMAL /HPF
NRBC BLD AUTO-RTO: 0 /100 WBCS
PH UR STRIP.AUTO: 6 [PH]
PLATELET # BLD AUTO: 192 THOUSANDS/UL (ref 149–390)
PMV BLD AUTO: 10.6 FL (ref 8.9–12.7)
POTASSIUM SERPL-SCNC: 3.8 MMOL/L (ref 3.5–5.3)
PROT SERPL-MCNC: 7.4 G/DL (ref 6.4–8.4)
PROT UR STRIP-MCNC: ABNORMAL MG/DL
RBC # BLD AUTO: 4.55 MILLION/UL (ref 3.81–5.12)
RBC #/AREA URNS AUTO: NORMAL /HPF
SODIUM SERPL-SCNC: 136 MMOL/L (ref 135–147)
SP GR UR STRIP.AUTO: 1.01 (ref 1–1.04)
UROBILINOGEN UA: NEGATIVE MG/DL
WBC # BLD AUTO: 11.43 THOUSAND/UL (ref 4.31–10.16)
WBC #/AREA URNS AUTO: NORMAL /HPF

## 2024-12-16 PROCEDURE — 96365 THER/PROPH/DIAG IV INF INIT: CPT

## 2024-12-16 PROCEDURE — 85025 COMPLETE CBC W/AUTO DIFF WBC: CPT

## 2024-12-16 PROCEDURE — 81003 URINALYSIS AUTO W/O SCOPE: CPT

## 2024-12-16 PROCEDURE — 99285 EMERGENCY DEPT VISIT HI MDM: CPT

## 2024-12-16 PROCEDURE — 83735 ASSAY OF MAGNESIUM: CPT

## 2024-12-16 PROCEDURE — 96367 TX/PROPH/DG ADDL SEQ IV INF: CPT

## 2024-12-16 PROCEDURE — 83690 ASSAY OF LIPASE: CPT

## 2024-12-16 PROCEDURE — 36415 COLL VENOUS BLD VENIPUNCTURE: CPT

## 2024-12-16 PROCEDURE — 96375 TX/PRO/DX INJ NEW DRUG ADDON: CPT

## 2024-12-16 PROCEDURE — 84702 CHORIONIC GONADOTROPIN TEST: CPT

## 2024-12-16 PROCEDURE — 96366 THER/PROPH/DIAG IV INF ADDON: CPT

## 2024-12-16 PROCEDURE — 99284 EMERGENCY DEPT VISIT MOD MDM: CPT

## 2024-12-16 PROCEDURE — 81001 URINALYSIS AUTO W/SCOPE: CPT

## 2024-12-16 PROCEDURE — 80053 COMPREHEN METABOLIC PANEL: CPT

## 2024-12-16 PROCEDURE — 74177 CT ABD & PELVIS W/CONTRAST: CPT

## 2024-12-16 RX ORDER — ONDANSETRON 4 MG/1
4 TABLET, FILM COATED ORAL EVERY 6 HOURS
Qty: 12 TABLET | Refills: 0 | Status: SHIPPED | OUTPATIENT
Start: 2024-12-16

## 2024-12-16 RX ORDER — DROPERIDOL 2.5 MG/ML
1.25 INJECTION, SOLUTION INTRAMUSCULAR; INTRAVENOUS ONCE
Status: COMPLETED | OUTPATIENT
Start: 2024-12-16 | End: 2024-12-16

## 2024-12-16 RX ORDER — ACETAMINOPHEN 10 MG/ML
1000 INJECTION, SOLUTION INTRAVENOUS ONCE
Status: COMPLETED | OUTPATIENT
Start: 2024-12-16 | End: 2024-12-16

## 2024-12-16 RX ORDER — MAGNESIUM SULFATE HEPTAHYDRATE 40 MG/ML
2 INJECTION, SOLUTION INTRAVENOUS ONCE
Status: COMPLETED | OUTPATIENT
Start: 2024-12-16 | End: 2024-12-16

## 2024-12-16 RX ADMIN — SODIUM CHLORIDE 1000 ML: 0.9 INJECTION, SOLUTION INTRAVENOUS at 20:00

## 2024-12-16 RX ADMIN — IOHEXOL 100 ML: 350 INJECTION, SOLUTION INTRAVENOUS at 20:48

## 2024-12-16 RX ADMIN — ACETAMINOPHEN 1000 MG: 10 INJECTION INTRAVENOUS at 20:00

## 2024-12-16 RX ADMIN — SODIUM CHLORIDE 1000 ML: 0.9 INJECTION, SOLUTION INTRAVENOUS at 21:52

## 2024-12-16 RX ADMIN — MAGNESIUM SULFATE HEPTAHYDRATE 2 G: 40 INJECTION, SOLUTION INTRAVENOUS at 20:57

## 2024-12-16 RX ADMIN — DROPERIDOL 1.25 MG: 2.5 INJECTION, SOLUTION INTRAMUSCULAR; INTRAVENOUS at 20:02

## 2024-12-16 NOTE — Clinical Note
Ewelina Castellon was seen and treated in our emergency department on 12/16/2024.    No restrictions            Diagnosis:     Ewelina  .    She may return on this date: 12/18/2024         If you have any questions or concerns, please don't hesitate to call.      J Luis Wilson PA-C    ______________________________           _______________          _______________  Hospital Representative                              Date                                Time

## 2024-12-17 NOTE — ED CARE HANDOFF
Emergency Department Sign Out Note        Sign out and transfer of care from J Luis Wilosn PA-C. See Separate Emergency Department note.     The patient, Ewelina Castellon, was evaluated by the previous provider for vomiting, diarrhea, fevers, and right lower quadrant abdominal pain.  This morning the abdominal pain also began radiating into her right flank she reports decreased urination.        Workup Completed:  Results Reviewed       Procedure Component Value Units Date/Time    Urine Microscopic [[id number]]  (Normal) Collected: 12/16/24 2208    Lab Status: Final result Specimen: Urine, Other Updated: 12/16/24 2219     RBC, UA None Seen /hpf      WBC, UA None Seen /hpf      Epithelial Cells Occasional /hpf      Bacteria, UA Occasional /hpf     UA w Reflex to Microscopic w Reflex to Culture [[id number]]  (Abnormal) Collected: 12/16/24 2208    Lab Status: Final result Specimen: Urine, Other Updated: 12/16/24 2219     Color, UA Straw     Clarity, UA Clear     Specific Gravity, UA 1.010     pH, UA 6.0     Leukocytes, UA Negative     Nitrite, UA Negative     Protein, UA 15 (Trace) mg/dl      Glucose, UA Negative mg/dl      Ketones, UA Negative mg/dl      Bilirubin, UA Negative     Occult Blood, UA Negative     UROBILINOGEN UA Negative mg/dL     Pregnancy, hCG, quantitative [[id number]]  (Normal) Collected: 12/16/24 1956    Lab Status: Final result Specimen: Blood from Arm, Right Updated: 12/16/24 2037     HCG, Quant <0.6 mIU/mL     Narrative:       Expected Ranges:    HCG results between 5.0 and 25.0 mIU/mL may be indicative of early pregnancy but should be interpreted in light of the total clinical presentation.    HCG can rise to detectable levels in gail and post menopausal women (0-11.6 mIU/mL).     Approximate               Approximate HCG  Gestation age          Concentration ( mIU/mL)  _____________          ______________________   Weeks                      HCG values  0.2-1                       5-50  1-2                            2-3                         100-5000  3-4                         500-95130  4-5                         1000-91289  5-6                         06116-109768  6-8                         81958-607714  8-12                        68031-701406      CMP [[id number]] Collected: 12/16/24 1956    Lab Status: Final result Specimen: Blood from Arm, Right Updated: 12/16/24 2033     Sodium 136 mmol/L      Potassium 3.8 mmol/L      Chloride 103 mmol/L      CO2 25 mmol/L      ANION GAP 8 mmol/L      BUN 13 mg/dL      Creatinine 1.04 mg/dL      Glucose 107 mg/dL      Calcium 8.9 mg/dL      AST 17 U/L      ALT 17 U/L      Alkaline Phosphatase 46 U/L      Total Protein 7.4 g/dL      Albumin 4.0 g/dL      Total Bilirubin 0.79 mg/dL      eGFR 63 ml/min/1.73sq m     Narrative:      National Kid[employer] Foundation guidelines for Chronic Kidney Disease (CKD):     Stage 1 with normal or high GFR (GFR > 90 mL/min/1.73 square meters)    Stage 2 Mild CKD (GFR = 60-89 mL/min/1.73 square meters)    Stage 3A Moderate CKD (GFR = 45-59 mL/min/1.73 square meters)    Stage 3B Moderate CKD (GFR = 30-44 mL/min/1.73 square meters)    Stage 4 Severe CKD (GFR = 15-29 mL/min/1.73 square meters)    Stage 5 End Stage CKD (GFR <15 mL/min/1.73 square meters)  Note: GFR calculation is accurate only with a steady state creatinine    Lipase [[id number]]  (Normal) Collected: 12/16/24 1956    Lab Status: Final result Specimen: Blood from Arm, Right Updated: 12/16/24 2033     Lipase 11 u/L     Magnesium [[id number]]  (Abnormal) Collected: 12/16/24 1956    Lab Status: Final result Specimen: Blood from Arm, Right Updated: 12/16/24 2033     Magnesium 1.5 mg/dL     CBC and differential [[id number]]  (Abnormal) Collected: 12/16/24 1956    Lab Status: Final result Specimen: Blood from Arm, Right Updated: 12/16/24 2028     WBC 11.43 Thousand/uL      RBC 4.55 Million/uL      Hemoglobin 12.4 g/dL      Hematocrit 38.5 %      MCV 85 fL      MCH  27.3 pg      MCHC 32.2 g/dL      RDW 16.1 %      MPV 10.6 fL      Platelets 192 Thousands/uL      nRBC 0 /100 WBCs      Segmented % 90 %      Immature Grans % 0 %      Lymphocytes % 5 %      Monocytes % 5 %      Eosinophils Relative 0 %      Basophils Relative 0 %      Absolute Neutrophils 10.23 Thousands/µL      Absolute Immature Grans 0.05 Thousand/uL      Absolute Lymphocytes 0.55 Thousands/µL      Absolute Monocytes 0.57 Thousand/µL      Eosinophils Absolute 0.01 Thousand/µL      Basophils Absolute 0.02 Thousands/µL     Narrative:      This is an appended report.  These results have been appended to a previously verified report.            CT Abdomen pelvis with contrast   Final Result by iKke Mcgarry MD (12/16 2131)      No acute inflammatory process identified in the abdomen or pelvis.         Workstation performed: [id number]               ED Course / Workup Pending (followup):  ED Course as of 12/16/24 2241   Mon Dec 16, 2024   2147 Patient presented with abdominal pain, vomiting, diarrhea, and fever x 2 days.  CT negative for acute pathology.  Labs showed minimal leukocytosis and hypomagnesemia.  Mag sulfate ordered and is running.  Reassess after mag and also obtain UA prior to discharge.  Will also need to pass a p.o. challenge.   2221 WBC, UA: None Seen   2221 Epithelial Cells: Occasional   2221 Bacteria, UA: Occasional   2230 IV magnesium tolerated without difficulty.  Patient passed a p.o. challenge and reports no residual nausea.  Will discharge home.         Procedures  None      Medical Decision Making  Patient with abdominal pain radiating into the right flank, V/D, and fevers.  CT unremarkable.  Labs did reveal hypomagnesemia for which the patient received 2 g of IV magnesium sulfate.  No reported side effects from the magnesium.  UA negative for UTI.  Patient reported significant relief with droperidol and did not have any episodes of vomiting while in the department.  She successfully passed a  p.o. challenge and felt comfortable discharge.  Strict return precautions discussed and she verbalized understanding.  Follow-up with PCP, but return to the ED in the interim with new or worsening symptoms.    Amount and/or Complexity of Data Reviewed  Labs: ordered. Decision-making details documented in ED Course.  Radiology: ordered.    Risk  Prescription drug management.        Disposition  Final diagnoses:   Gastroenteritis   Hypomagnesemia     Time reflects when diagnosis was documented in both MDM as applicable and the Disposition within this note       Time User Action Codes Description Comment    12/16/2024  9:38 PM J Luis Wilson Add [K52.9] Gastroenteritis     12/16/2024  9:51 PM Michaela Caruso Add [E83.42] Hypomagnesemia           ED Disposition       ED Disposition   Discharge    Condition   Stable    Date/Time   Mon Dec 16, 2024  9:44 PM    Comment   Ewelina Castellon discharge to home/self care.                   Follow-up Information       Follow up With Specialties Details Why Contact Info Additional Information    Ramesh Zaldivar MD Family Medicine   [address] [place] [id number] [phone]       [facility] Emergency Department Emergency Medicine  As needed, If symptoms worsen [address] [facility] [id number] [phone] [facility] Emergency Department          Discharge Medication List as of 12/16/2024 10:24 PM        START taking these medications    Details   ondansetron (ZOFRAN) 4 mg tablet Take 1 tablet (4 mg total) by mouth every 6 (six) hours, Starting Mon 12/16/2024, Normal           CONTINUE these medications which have NOT CHANGED    Details   acyclovir (ZOVIRAX) 800 mg tablet Take 1 tablet (800 mg total) by mouth 5 (five) times a day for 7 days, Starting Sat 3/2/2019, Until Sat 3/9/2019, Print      docusate sodium (COLACE) 100 mg capsule Take 1 capsule (100 mg total) by mouth every 12 (twelve) hours, Starting  Sat 3/2/2019, Print           No discharge procedures on file.       ED Provider  Electronically Signed by     Michaela Caruso PA-C  12/16/24 8738

## 2024-12-17 NOTE — ED PROVIDER NOTES
Time reflects when diagnosis was documented in both MDM as applicable and the Disposition within this note       Time User Action Codes Description Comment    12/16/2024  9:38 PM J Luis Wilson Add [K52.9] Gastroenteritis     12/16/2024  9:51 PM Michaela Caruso Add [E83.42] Hypomagnesemia           ED Disposition       ED Disposition   Discharge    Condition   Stable    Date/Time   Mon Dec 16, 2024  9:44 PM    Comment   Ewelina Castellon discharge to home/self care.                   Assessment & Plan       Medical Decision Making  Patient is a 49-year-old female coming in for evaluation of nausea, vomiting, right lower quadrant pain radiating up to the right flank.  Patient does have history of nephrectomy.  Patient is in no acute distress at this time, however at time of sign off, urine is still pending.  Patient is also currently receiving magnesium to replete what she lost, most likely secondary to vomiting and diarrhea.  CT scan is overall within normal limits, labs are overall within normal limits with the exception of the hypomagnesemia.  Patient is no acute distress at this time, states that she is feeling better.  This is most likely a viral gastroenteritis    Amount and/or Complexity of Data Reviewed  Labs: ordered.  Radiology: ordered. Decision-making details documented in ED Course.    Risk  Prescription drug management.        ED Course as of 12/16/24 2203   Mon Dec 16, 2024   2136 CT Abdomen pelvis with contrast  No acute inflammatory process identified in the abdomen or pelvis.       Medications   magnesium sulfate 2 g/50 mL IVPB (premix) 2 g (2 g Intravenous New Bag 12/16/24 2057)   sodium chloride 0.9 % bolus 1,000 mL (1,000 mL Intravenous New Bag 12/16/24 2152)   droperidol (INAPSINE) injection 1.25 mg (1.25 mg Intravenous Given 12/16/24 2002)   sodium chloride 0.9 % bolus 1,000 mL (0 mL Intravenous Stopped 12/16/24 2153)   acetaminophen (Ofirmev) injection 1,000 mg (0 mg Intravenous Stopped  12/16/24 2059)   iohexol (OMNIPAQUE) 350 MG/ML injection (MULTI-DOSE) 100 mL (100 mL Intravenous Given 12/16/24 2048)       ED Risk Strat Scores                          SBIRT 22yo+      Flowsheet Row Most Recent Value   Initial Alcohol Screen: US AUDIT-C     1. How often do you have a drink containing alcohol? 0 Filed at: 12/16/2024 2011   2. How many drinks containing alcohol do you have on a typical day you are drinking?  0 Filed at: 12/16/2024 2011   3b. FEMALE Any Age, or MALE 65+: How often do you have 4 or more drinks on one occassion? 0 Filed at: 12/16/2024 2011   Audit-C Score 0 Filed at: 12/16/2024 2011   HARRY: How many times in the past year have you...    Used an illegal drug or used a prescription medication for non-medical reasons? Never Filed at: 12/16/2024 2011                            History of Present Illness       Chief Complaint   Patient presents with    Abdominal Pain     Pt reports she started having RLQ pain around 9:30 pm last night, states she vomited 6x since last night and had some diarrhea. Reports she had a fever at home today of 101. Reports she is not able to hold down anything. States this morning she started having pain in her right flank area as well.Esther any urination issues. Last dose of tylenol was at 0730 this morning.        Past Medical History:   Diagnosis Date    Abnormal Pap smear of cervix 03/2013    ASCUS/pos other HPV; 10/2014 pap negative; 5/2021: LGSIL, neg HPV--> colpo    History of nephrectomy     kidney donation    Kidney stone       Past Surgical History:   Procedure Laterality Date    APPENDECTOMY      NEPHRECTOMY      donation    TUBAL LIGATION        Family History   Problem Relation Age of Onset    Breast cancer Mother     Heart disease Mother         pacemaker    Hyperlipidemia Mother     Hypertension Mother     Diabetes Father     Heart attack Father     Heart disease Father     Heart failure Father     Hyperlipidemia Father     Hypertension Father      Stroke Father     Ovarian cancer Sister 48    Colon cancer Neg Hx     Uterine cancer Neg Hx     Pulmonary embolism Neg Hx     Venous thrombosis Neg Hx     Migraines Neg Hx       Social History     Tobacco Use    Smoking status: Never    Smokeless tobacco: Never   Vaping Use    Vaping status: Never Used   Substance Use Topics    Alcohol use: Never    Drug use: Never      E-Cigarette/Vaping    E-Cigarette Use Never User       E-Cigarette/Vaping Substances    Nicotine No     THC No     CBD No     Flavoring No     Other No     Unknown No       I have reviewed and agree with the history as documented.     Patient is a 49-year-old female coming in for evaluation of rlq abdominal pain, nausea, vomiting.   Pain now radiates to her right flank. Does have a history of left nephrectomy. Has been going for 2 days.  Reports that she cannot keep anything down which is why she has not taken any pain medications.  No no blood in her stool.  Is currently nontoxic. Denie urinary symptoms      Abdominal Pain  Associated symptoms: fever, nausea and vomiting    Associated symptoms: no chills, no diarrhea and no fatigue        Review of Systems   Constitutional:  Positive for fever. Negative for chills and fatigue.   Gastrointestinal:  Positive for abdominal pain, nausea and vomiting. Negative for blood in stool and diarrhea.           Objective       ED Triage Vitals   Temperature Pulse Blood Pressure Respirations SpO2 Patient Position - Orthostatic VS   12/16/24 1850 12/16/24 1850 12/16/24 1850 12/16/24 1850 12/16/24 1850 12/16/24 1850   99.5 °F (37.5 °C) (!) 114 148/71 18 98 % Sitting      Temp Source Heart Rate Source BP Location FiO2 (%) Pain Score    12/16/24 1850 12/16/24 2130 12/16/24 1850 -- --    Tympanic Monitor Left arm        Vitals      Date and Time Temp Pulse SpO2 Resp BP Pain Score FACES Pain Rating User   12/16/24 2130 99.3 °F (37.4 °C) 104 97 % 16 133/60 -- -- RG   12/16/24 1850 99.5 °F (37.5 °C) 114 98 % 18 148/71 --  -- VINEET            Physical Exam  Vitals reviewed.   Constitutional:       Appearance: Normal appearance. She is normal weight.   HENT:      Head: Normocephalic and atraumatic.      Right Ear: External ear normal.      Left Ear: External ear normal.      Nose: Nose normal.   Eyes:      Conjunctiva/sclera: Conjunctivae normal.   Cardiovascular:      Rate and Rhythm: Normal rate.   Pulmonary:      Effort: Pulmonary effort is normal.   Abdominal:      Tenderness: There is abdominal tenderness in the right lower quadrant. There is right CVA tenderness. There is no left CVA tenderness.   Musculoskeletal:         General: Normal range of motion.      Cervical back: Normal range of motion.   Skin:     General: Skin is warm and dry.   Neurological:      Mental Status: She is alert.         Results Reviewed       Procedure Component Value Units Date/Time    Pregnancy, hCG, quantitative [[id number]]  (Normal) Collected: 12/16/24 1956    Lab Status: Final result Specimen: Blood from Arm, Right Updated: 12/16/24 2037     HCG, Quant <0.6 mIU/mL     Narrative:       Expected Ranges:    HCG results between 5.0 and 25.0 mIU/mL may be indicative of early pregnancy but should be interpreted in light of the total clinical presentation.    HCG can rise to detectable levels in gail and post menopausal women (0-11.6 mIU/mL).     Approximate               Approximate HCG  Gestation age          Concentration ( mIU/mL)  _____________          ______________________   Weeks                      HCG values  0.2-1                       5-50  1-2                           2-3                         100-5000  3-4                         500-84697  4-5                         1000-80253  5-6                         08682-944762  6-8                         31298-183157  8-12                        35734-248697      [facility] [[id number]] Collected: 12/16/24 1956    Lab Status: Final result Specimen: Blood from Arm, Right Updated: 12/16/24 2033      Sodium 136 mmol/L      Potassium 3.8 mmol/L      Chloride 103 mmol/L      CO2 25 mmol/L      ANION GAP 8 mmol/L      BUN 13 mg/dL      Creatinine 1.04 mg/dL      Glucose 107 mg/dL      Calcium 8.9 mg/dL      AST 17 U/L      ALT 17 U/L      Alkaline Phosphatase 46 U/L      Total Protein 7.4 g/dL      Albumin 4.0 g/dL      Total Bilirubin 0.79 mg/dL      eGFR 63 ml/min/1.73sq m     Narrative:      National Kidney Disease Foundation guidelines for Chronic Kidney Disease (CKD):     Stage 1 with normal or high GFR (GFR > 90 mL/min/1.73 square meters)    Stage 2 Mild CKD (GFR = 60-89 mL/min/1.73 square meters)    Stage 3A Moderate CKD (GFR = 45-59 mL/min/1.73 square meters)    Stage 3B Moderate CKD (GFR = 30-44 mL/min/1.73 square meters)    Stage 4 Severe CKD (GFR = 15-29 mL/min/1.73 square meters)    Stage 5 End Stage CKD (GFR <15 mL/min/1.73 square meters)  Note: GFR calculation is accurate only with a steady state creatinine    Lipase [[id number]]  (Normal) Collected: 12/16/24 1956    Lab Status: Final result Specimen: Blood from Arm, Right Updated: 12/16/24 2033     Lipase 11 u/L     Magnesium [[id number]]  (Abnormal) Collected: 12/16/24 1956    Lab Status: Final result Specimen: Blood from Arm, Right Updated: 12/16/24 2033     Magnesium 1.5 mg/dL     CBC and differential [[id number]]  (Abnormal) Collected: 12/16/24 1956    Lab Status: Final result Specimen: Blood from Arm, Right Updated: 12/16/24 2028     WBC 11.43 Thousand/uL      RBC 4.55 Million/uL      Hemoglobin 12.4 g/dL      Hematocrit 38.5 %      MCV 85 fL      MCH 27.3 pg      MCHC 32.2 g/dL      RDW 16.1 %      MPV 10.6 fL      Platelets 192 Thousands/uL      nRBC 0 /100 WBCs      Segmented % 90 %      Immature Grans % 0 %      Lymphocytes % 5 %      Monocytes % 5 %      Eosinophils Relative 0 %      Basophils Relative 0 %      Absolute Neutrophils 10.23 Thousands/µL      Absolute Immature Grans 0.05 Thousand/uL      Absolute Lymphocytes 0.55 Thousands/µL       Absolute Monocytes 0.57 Thousand/µL      Eosinophils Absolute 0.01 Thousand/µL      Basophils Absolute 0.02 Thousands/µL     Narrative:      This is an appended report.  These results have been appended to a previously verified report.    UA w Reflex to Microscopic w Reflex to Culture [[id number]]     Lab Status: No result Specimen: Urine             CT Abdomen pelvis with contrast   Final Interpretation by Kike Mcgarry MD (12/16 2131)      No acute inflammatory process identified in the abdomen or pelvis.         Workstation performed: [id number]             Procedures    ED Medication and Procedure Management   Prior to Admission Medications   Prescriptions Last Dose Informant Patient Reported? Taking?   acyclovir (ZOVIRAX) 800 mg tablet   No No   Sig: Take 1 tablet (800 mg total) by mouth 5 (five) times a day for 7 days   docusate sodium (COLACE) 100 mg capsule   No No   Sig: Take 1 capsule (100 mg total) by mouth every 12 (twelve) hours      Facility-Administered Medications: None     Patient's Medications   Discharge Prescriptions    ONDANSETRON (ZOFRAN) 4 MG TABLET    Take 1 tablet (4 mg total) by mouth every 6 (six) hours       Start Date: 12/16/2024End Date: --       Order Dose: 4 mg       Quantity: 12 tablet    Refills: 0     No discharge procedures on file.  ED SEPSIS DOCUMENTATION   Time reflects when diagnosis was documented in both MDM as applicable and the Disposition within this note       Time User Action Codes Description Comment    12/16/2024  9:38 PM J Luis Wilson Add [K52.9] Gastroenteritis     12/16/2024  9:51 PM Michaela Caruso Add [E83.42] Hypomagnesemia                  J Luis Wilson PA-C  12/16/24 9547